# Patient Record
Sex: MALE | Race: OTHER | Employment: FULL TIME | URBAN - METROPOLITAN AREA
[De-identification: names, ages, dates, MRNs, and addresses within clinical notes are randomized per-mention and may not be internally consistent; named-entity substitution may affect disease eponyms.]

---

## 2021-07-08 ENCOUNTER — HOSPITAL ENCOUNTER (OUTPATIENT)
Dept: PHYSICAL THERAPY | Age: 56
Discharge: HOME | End: 2021-07-08
Payer: COMMERCIAL

## 2021-07-08 PROCEDURE — 97140 MANUAL THERAPY 1/> REGIONS: CPT

## 2021-07-08 PROCEDURE — 97161 PT EVAL LOW COMPLEX 20 MIN: CPT

## 2021-07-08 PROCEDURE — 97110 THERAPEUTIC EXERCISES: CPT

## 2021-07-08 NOTE — THERAPY EVALUATION
Adam Velez  : 1965 [address], [address] [place] [address]  [phone]   [phone]        OUTPATIENT PHYSICAL THERAPY:Initial Assessment 2021         ICD-10: Treatment Diagnosis: Pain in left hip (M25.552) and Muscle weakness (generalized) (M62.81) and Other abnormalities of gait and mobility (R26.89)  Precautions/Allergies:   Patient has no allergy information on record. Fall Risk Score:     Ambulatory/Rehab Services H2 Model Falls Risk Assessment    Risk Factors:       (1)  Gender [Male] Ability to Rise from Chair:       (0)  Ability to rise in a single movement    Falls Prevention Plan:       No modifications necessary   Total: (5 or greater = High Risk): 1    © [facility] [employer]. All Rights Reserved. [facility] [employer] Patent #[id number]. Federal Law prohibits the replication, distribution or use without written permission from [employer]     MD Orders: eval and treat MEDICAL/REFERRING DIAGNOSIS:  Unspecified abdominal pain [R10.9]   DATE OF ONSET: several months ago  REFERRING PHYSICIAN: Alina Villareal NP  RETURN PHYSICIAN APPOINTMENT: none     INITIAL ASSESSMENT:  Mr. Earle Nguyen presents with L ant hip pain following roller skating injury. The pain hasn't improved and has actually worsened causing him to have to stop running now. He was screened for a hernia. He will benefit from skilled PT to address the pain, stiffness, weakness he has to return him to premorbid status. PROBLEM LIST (Impacting functional limitations):  1. Decreased Strength  2. Decreased ADL/Functional Activities  3. Decreased Ambulation Ability/Technique  4. Increased Pain  5. Decreased Activity Tolerance  6. Decreased Flexibility/Joint Mobility  7. Decreased [place] with Home Exercise Program INTERVENTIONS PLANNED:  1. Home Exercise Program (HEP)  2. Manual Therapy  3. Therapeutic Activites  4.  Therapeutic Exercise/Strengthening  5. aquatics    TREATMENT PLAN:  Effective Dates: 7/8/2021 TO 9/6/2021 (60 days). Frequency/Duration: 2 times a week for 60 Days  GOALS: (Goals have been discussed and agreed upon with patient.)  Short-Term Functional Goals: Time Frame: 4 weeks  1. Pt to report compliance with HEP  2. Pt to demonstrate full flex L hip without pain  3. Pt to report no pain with normal ADL's  Discharge Goals: Time Frame: 12 weeks  1. Pt to minh therapy inc aquatic running without pain  2. Pt to resume running without pain limiting him  Rehabilitation Potential For Stated Goals: Good  Regarding Sergio Cotto's therapy, I certify that the treatment plan above will be carried out by a therapist or under their direction. Thank you for this referral,  Mary Iniguez, PT       Referring Physician Signature: Susan Welch NP              Date                      HISTORY:   History of Present Injury/Illness (Reason for Referral): A few months ago pt was trying to roller skate. His legs went apart and the L stretched more than it should. He runs everyday. The pain has started to worsen. He thought it was a hernia but the NP said it isn't. He has been trying to stretch and swinging leg in/out, forward/backward hurts. He cannot run now. Normal activities do not bother too much. Past Medical History/Comorbidities:   Mr. Juarez Blake  has no past medical history on file. Mr. Juarez Blake  has no past surgical history on file. Social History/Living Environment:     pt lives alone in single story home  Prior Level of Function/Work/Activity:  FT  Dominant Side:         LEFT  Current Medications:  No current outpatient medications on file.    Date Last Reviewed:  7/8/2021   # of Personal Factors/Comorbidities that affect the Plan of Care: 0: LOW COMPLEXITY   EXAMINATION:   Observation/Orthostatic Postural Assessment:          symm pelvic landmarks   Palpation:          Marked tenderness L ant hip;   ROM:         WNL trunk and B hips; Some pain with end range L hip flex;  Mild tightness L iliopsoas and quad  Strength:         5/5 except L hip flex due to pain;  Pt has difficulty recruiting L glut max  Special Tests:          Pos L FADIR  Neurological Screen:        unremarkable  Functional Mobility:         Gait/Ambulation:  WNL except running unable to minh        Stairs:  Reciprocal pattern  Balance: WNL   Body Structures Involved:  1. Muscles Body Functions Affected:  1. Sensory/Pain  2. Movement Related Activities and Participation Affected:  1. Mobility  2. Domestic Life  3. Community, Social and [place]   # of elements that affect the Plan of Care: 1-2: LOW COMPLEXITY   CLINICAL PRESENTATION:   Presentation: Stable and uncomplicated: LOW COMPLEXITY   CLINICAL DECISION MAKING:   Tool Used: Lower Extremity Functional Scale (LEFS)  Score:  Initial: 53/80 Most Recent: X/80 (Date: -- )   Interpretation of Score: 20 questions each scored on a 5 point scale with 0 representing \"extreme difficulty or unable to perform\" and 4 representing \"no difficulty\". The lower the score, the greater the functional disability. 80/80 represents no disability. Minimal detectable change is 9 points. Medical Necessity:   · Patient demonstrates good rehab potential due to higher previous functional level. Reason for Services/Other Comments:  · Patient continues to require present interventions due to patient's inability to resume full activity inc running without pain.    Use of outcome tool(s) and clinical judgement create a POC that gives a: Clear prediction of patient's progress: LOW COMPLEXITY     Total Treatment Duration:  PT Patient Time In/Time Out  Time In: 0130  Time Out: 0215     Jairon Ends Auxier, PT

## 2021-07-08 NOTE — PROGRESS NOTES
Marchip Steel  : 1965  Primary: [employer]  Secondary:  [address] @ [address], [place] KELI Yang.  [phone]   [phone]      OUTPATIENT PHYSICAL THERAPY: Daily Treatment Note  2021   ICD-10: Treatment Diagnosis: Pain in left hip (M25.552) and Muscle weakness (generalized) (M62.81) and Other abnormalities of gait and mobility (R26.89)     L groin pain    Effective Dates: 2021 TO 2021 (60 days). Frequency/Duration: 2 times a week for 60 Days  GOALS: (Goals have been discussed and agreed upon with patient.)  Short-Term Functional Goals: Time Frame: 4 weeks  1. Pt to report compliance with HEP  2. Pt to demonstrate full flex L hip without pain  3. Pt to report no pain with normal ADL's  Discharge Goals: Time Frame: 12 weeks  1. Pt to minh therapy inc aquatic running without pain  2. Pt to resume running without pain limiting him  _______________________________________________________________________________  Pre-treatment Symptoms/Complaints: Mr. Ashley Albright presents with L ant hip pain following roller skating injury. The pain hasn't improved and has actually worsened causing him to have to stop running now. He was screened for a hernia. Pain: Initial: 3 /10 Post Session:  1/10   Medications Last Reviewed:  2021    Updated Objective Findings:              Palpation:          Marked tenderness L ant hip   ROM:         WNL trunk and B hips; Some pain with end range L hip flex;  Mild tightness L iliopsoas and quad  Strength:         5/5 except L hip flex due to pain; Pt has difficulty recruiting L glut max  Special Tests:          Pos L FADIR  Functional Mobility:         Gait/Ambulation:  WNL except running unable to minh        Stairs:  Reciprocal pattern  Balance:           WNL    Tool Used: Lower Extremity Functional Scale (LEFS)  Score:  Initial: 53/80 Most Recent: X (Date: -- )   Interpretation of Score: 20 questions each scored on a 5 point scale with 0 representing \"extreme difficulty or unable to perform\" and 4 representing \"no difficulty\". The lower the score, the greater the functional disability. 80/80 represents no disability. Minimal detectable change is 9 points. See evaluation note from today     TREATMENT:   THERAPEUTIC ACTIVITY: ( see below for minutes): Therapeutic activities per grid below to improve mobility. Required moderate verbal and manual cues to improve functional mobility . THERAPEUTIC EXERCISE: (see below for minutes):  Exercises per grid below to improve strength. Required moderate verbal and manual cues to promote proper body alignment, promote proper body posture, promote proper body mechanics and promote proper body breathing techniques. Progressed resistance, range, repetitions and complexity of movement as indicated. MANUAL THERAPY: (see below for minutes): Joint mobilization and Soft tissue mobilization was utilized and necessary because of the patient's restricted joint motion, painful spasm, loss of articular motion and restricted motion of soft tissue. MODALITIES: (see below for minutes):      for pain modulation    AQUATIC THERAPY (see below for minutes): Aquatic treatment performed per flow grid for Decreased muscle strength, Decreased endurance, Decreased static/dynamic balance and reactive control, Decreased activity endurance, Decompression, Ease of movement and Low impact and reduced weight bearing activity.     Date: 7/8/21       Modalities:                        Manual Therapy: 10 mins       STM L ant hip supine               Aquatics Activities:        GAIT (F/B/S/M)        SLR gait        Hamstring Curl gait         Rockettes        Squats        Calf raises        Hamstring stretch B        Piriformis stretch B        Step ups ant B        Deep well                                Therapeutic Exercises: 20 mins       Pt education, postural education, HEP, functional breathing 10 mins       Prone quad stretch L 2 x 20 sec       Orion stretch L 3 x 30 sec       S/L hip abd L X 15       Clams L X 15               HEP: see hand out    [employer] Portal  Treatment/Session Summary:    · Response to Treatment:  Pt has good understanding and minh to information presented. · Communication/Consultation:  None today  · Equipment provided today:  None today  · Recommendations/Intent for next treatment session: Next visit will focus on stretching, strengthening, manual techniques as indicated.     Total Treatment Billable Duration:  45 mins  PT Patient Time In/Time Out  Time In: 0130  Time Out: 0215    Chase Sparks PT    Future Appointments   Date Time Provider Paul Peace   7/12/2021  8:45 AM Corinna Everett, PT [facility]   7/16/2021  8:00 AM Corinna Everett, PT [facility]   7/19/2021  8:00 AM Corinna Everett, PT [facility]   7/21/2021  4:00 PM Corinna Everett, PT [facility]   7/28/2021 10:15 AM Coirnna Everett, PT [facility]   7/30/2021  8:00 AM Corinna Everett, PT [facility]

## 2021-07-13 ENCOUNTER — HOSPITAL ENCOUNTER (OUTPATIENT)
Dept: PHYSICAL THERAPY | Age: 56
Discharge: HOME | End: 2021-07-13
Payer: COMMERCIAL

## 2021-07-13 PROCEDURE — 97140 MANUAL THERAPY 1/> REGIONS: CPT

## 2021-07-13 PROCEDURE — 97110 THERAPEUTIC EXERCISES: CPT

## 2021-07-13 NOTE — PROGRESS NOTES
Graciela White  : 1965  Primary: [employer]  Secondary:  [address] @ [address], [facility] [place].  [phone]   [phone]      OUTPATIENT PHYSICAL THERAPY: Daily Treatment Note  2021   ICD-10: Treatment Diagnosis: Pain in left hip (M25.552) and Muscle weakness (generalized) (M62.81) and Other abnormalities of gait and mobility (R26.89)     L groin pain    Effective Dates: 2021 TO 2021 (60 days). Frequency/Duration: 2 times a week for 60 Days  GOALS: (Goals have been discussed and agreed upon with patient.)  Short-Term Functional Goals: Time Frame: 4 weeks  1. Pt to report compliance with HEP  2. Pt to demonstrate full flex L hip without pain  3. Pt to report no pain with normal ADL's  Discharge Goals: Time Frame: 12 weeks  1. Pt to minh therapy inc aquatic running without pain  2. Pt to resume running without pain limiting him  _______________________________________________________________________________  Pre-treatment Symptoms/Complaints: I am feeling better. I can jog without pain. I still can't really run but jogging no longer hurts. Pain: Initial: 1 /10 Post Session:  No pain with ex's   Medications Last Reviewed:  2021    Updated Objective Findings:              Palpation:          Marked tenderness L ant hip   ROM:         WNL trunk and B hips;   Some pain with end range L hip flex;  Mild tightness L iliopsoas and quad  Strength:         5/5 except L hip flex due to pain; Pt has difficulty recruiting L glut max  Special Tests:          Pos L FADIR  Functional Mobility:         Gait/Ambulation:  WNL except running unable to minh        Stairs:  Reciprocal pattern      Tool Used: Lower Extremity Functional Scale (LEFS)  Score:  Initial:  Most Recent:  (Date: -- )   Interpretation of Score: 20 questions each scored on a 5 point scale with 0 representing \"extreme difficulty or unable to perform\" and 4 representing \"no difficulty\". The lower the score, the greater the functional disability. 80/80 represents no disability. Minimal detectable change is 9 points. TREATMENT:   THERAPEUTIC ACTIVITY: ( see below for minutes): Therapeutic activities per grid below to improve mobility. Required moderate verbal and manual cues to improve functional mobility . THERAPEUTIC EXERCISE: (see below for minutes):  Exercises per grid below to improve strength. Required moderate verbal and manual cues to promote proper body alignment, promote proper body posture, promote proper body mechanics and promote proper body breathing techniques. Progressed resistance, range, repetitions and complexity of movement as indicated. MANUAL THERAPY: (see below for minutes): Joint mobilization and Soft tissue mobilization was utilized and necessary because of the patient's restricted joint motion, painful spasm, loss of articular motion and restricted motion of soft tissue. MODALITIES: (see below for minutes):      for pain modulation    AQUATIC THERAPY (see below for minutes): Aquatic treatment performed per flow grid for Decreased muscle strength, Decreased endurance, Decreased static/dynamic balance and reactive control, Decreased activity endurance, Decompression, Ease of movement and Low impact and reduced weight bearing activity.     Date: 7/8/21 7/13/21  Visit 2         Modalities:                                 Manual Therapy: 10 mins 10 mins         STM L ant hip supine supine                    Aquatics Activities:           GAIT (F/B/S/M)           SLR gait           Hamstring Curl gait            Rockettes           Squats           Calf raises           Hamstring stretch B           Piriformis stretch B           Step ups ant B           Deep well                                            Therapeutic Exercises: 20 mins 35 mins         Pt education, postural education, HEP, functional breathing 10 mins          Prone quad stretch L 2 x 20 sec          Orion stretch L 3 x 30 sec 3 mins         S/L hip abd L X 15 X 30         Clams L X 15 X 30         Bridging   Double   Single B         Sit to stand   X 30         Step ups ant B  6 in x 30         Step ups lat B  6 in x 20         HEP: see hand out    [employer] Portal  Treatment/Session Summary:    · Response to Treatment:  No pain with ex's. Good ex minh. Will utilize aquatics next visit to work on jogging/running in addition to stretching, strengthening. · Communication/Consultation:  None today  · Equipment provided today:  None today  · Recommendations/Intent for next treatment session: Next visit will focus on stretching, strengthening, manual techniques as indicated.     Total Treatment Billable Duration:  45 mins  PT Patient Time In/Time Out  Time In: 1100  Time Out: Andres [id number], PT    Future Appointments   Date Time Provider Paul Peace   7/16/2021  8:00 AM Donald Everett, PT [facility]   7/20/2021  9:30 AM Donald Everett, PT [facility]   7/23/2021  3:00 PM Donald Everett, PT [facility]   7/28/2021 10:15 AM Donald Everett, PT [facility]   7/30/2021  8:00 AM Donald Everett, PT [facility]

## 2021-07-16 ENCOUNTER — HOSPITAL ENCOUNTER (OUTPATIENT)
Dept: PHYSICAL THERAPY | Age: 56
Discharge: HOME | End: 2021-07-16
Payer: COMMERCIAL

## 2021-07-16 PROCEDURE — 97113 AQUATIC THERAPY/EXERCISES: CPT

## 2021-07-16 NOTE — PROGRESS NOTES
Floresita López  : 1965  Primary: [employer]  Secondary:  [address] @ [address], [facility] [place].  [phone]   [phone]      OUTPATIENT PHYSICAL THERAPY: Daily Treatment Note  2021   ICD-10: Treatment Diagnosis: Pain in left hip (M25.552) and Muscle weakness (generalized) (M62.81) and Other abnormalities of gait and mobility (R26.89)     L groin pain    Effective Dates: 2021 TO 2021 (60 days). Frequency/Duration: 2 times a week for 60 Days  GOALS: (Goals have been discussed and agreed upon with patient.)  Short-Term Functional Goals: Time Frame: 4 weeks  1. Pt to report compliance with HEP  2. Pt to demonstrate full flex L hip without pain  3. Pt to report no pain with normal ADL's  Discharge Goals: Time Frame: 12 weeks  1. Pt to minh therapy inc aquatic running without pain  2. Pt to resume running without pain limiting him  _______________________________________________________________________________  Pre-treatment Symptoms/Complaints:  I ran 3 miles this morning with almost no pain. Pain: Initial: 1 /10 Post Session:  Running in the pool feels so good because I am not worried about pain so I let the hip move freely. Medications Last Reviewed:  2021    Updated Objective Findings:            Palpation:          Marked tenderness L ant hip   ROM:         WNL trunk and B hips;   Some pain with end range L hip flex;  Mild tightness L iliopsoas and quad  Strength:         5/5 except L hip flex due to pain; Pt has difficulty recruiting L glut max  Special Tests:          Pos L FADIR  Functional Mobility:         Gait/Ambulation:  WNL except running unable to minh        Stairs:  Reciprocal pattern      Tool Used: Lower Extremity Functional Scale (LEFS)  Score:  Initial:  Most Recent:  (Date: -- )   Interpretation of Score: 20 questions each scored on a 5 point scale with 0 representing \"extreme difficulty or unable to perform\" and 4 representing \"no difficulty\". The lower the score, the greater the functional disability. 80/80 represents no disability. Minimal detectable change is 9 points. TREATMENT:   THERAPEUTIC ACTIVITY: ( see below for minutes): Therapeutic activities per grid below to improve mobility. Required moderate verbal and manual cues to improve functional mobility . THERAPEUTIC EXERCISE: (see below for minutes):  Exercises per grid below to improve strength. Required moderate verbal and manual cues to promote proper body alignment, promote proper body posture, promote proper body mechanics and promote proper body breathing techniques. Progressed resistance, range, repetitions and complexity of movement as indicated. MANUAL THERAPY: (see below for minutes): Joint mobilization and Soft tissue mobilization was utilized and necessary because of the patient's restricted joint motion, painful spasm, loss of articular motion and restricted motion of soft tissue. MODALITIES: (see below for minutes):      for pain modulation    AQUATIC THERAPY (see below for minutes): Aquatic treatment performed per flow grid for Decreased muscle strength, Decreased endurance, Decreased static/dynamic balance and reactive control, Decreased activity endurance, Decompression, Ease of movement and Low impact and reduced weight bearing activity.     Date: 7/8/21 7/13/21  Visit 2 7/16/21  Visit 3        Modalities:                                 Manual Therapy: 10 mins 10 mins         STM L ant hip supine supine                    Aquatics Activities:   55 mins        GAIT (F/B/S/M)   3 laps        SLR gait   3 laps        Rockettes   3 laps        Step ups ant B           Deep well jog with noodle   5 mins        Pool jog   25 mins                              Therapeutic Exercises: 20 mins 35 mins         Pt education, postural education, HEP, functional breathing 10 mins          Prone quad stretch L 2 x 20 sec Orion stretch L 3 x 30 sec 3 mins         S/L hip abd L X 15 X 30         Clams L X 15 X 30         Bridging   Double   Single B         Sit to stand   X 30         Step ups ant B  6 in x 30         Step ups lat B  6 in x 20         HEP: see hand out    [employer] Portal  Treatment/Session Summary:    · Response to Treatment:  Good response to pool jogging. No pain with ex's. Continue as indicated. · Communication/Consultation:  None today  · Equipment provided today:  None today  · Recommendations/Intent for next treatment session: Next visit will focus on stretching, strengthening, manual techniques as indicated.     Total Treatment Billable Duration:  55 mins  PT Patient Time In/Time Out  Time In: 2453  Time Out: [address] ([place] [address]), PT    Future Appointments   Date Time Provider Paul Peace   7/20/2021  9:30 AM Marguerite, Donald , PT [facility]   7/23/2021  3:00 PM StocktonAnaDonald , PT [facility]   7/28/2021 10:15 AM Amrguerite Donald , PT [facility]   7/30/2021  8:00 AM [place] Donald , PT [facility]   8/2/2021  4:00 PM [place] Donald , PT [facility]   8/4/2021  4:00 PM Marguerite Donald , PT [facility]   8/9/2021  4:00 PM Marguerite Donald , PT [facility]   8/12/2021  8:00 AM Marguerite Donald , PT [facility]   8/17/2021  8:00 AM Marguerite, Donald , PT [facility]   8/19/2021  8:00 AM Marguerite Donald , PT [facility]   8/23/2021  4:00 PM [place] Donald , PT [facility]   8/25/2021  8:00 AM [place] Donald , PT [facility]   8/30/2021  4:00 PM Donald Everett, PT [facility]

## 2021-07-20 ENCOUNTER — HOSPITAL ENCOUNTER (OUTPATIENT)
Dept: PHYSICAL THERAPY | Age: 56
Discharge: HOME | End: 2021-07-20
Payer: COMMERCIAL

## 2021-07-20 PROCEDURE — 97110 THERAPEUTIC EXERCISES: CPT

## 2021-07-20 NOTE — PROGRESS NOTES
Alexandru Mayra  : 1965  Primary: [employer]  Secondary:  [address]  [phone]   [phone]      OUTPATIENT PHYSICAL THERAPY: Daily Treatment Note  2021   ICD-10: Treatment Diagnosis: Pain in left hip (M25.552) and Muscle weakness (generalized) (M62.81) and Other abnormalities of gait and mobility (R26.89)     L groin pain    Effective Dates: 2021 TO 2021 (60 days). Frequency/Duration: 2 times a week for 60 Days  GOALS: (Goals have been discussed and agreed upon with patient.)  Short-Term Functional Goals: Time Frame: 4 weeks  1. Pt to report compliance with HEP  2. Pt to demonstrate full flex L hip without pain  3. Pt to report no pain with normal ADL's  Discharge Goals: Time Frame: 12 weeks  1. Pt to [facility] aquatic running without pain  2. Pt to resume running without pain limiting him  _______________________________________________________________________________  Pre-treatment Symptoms/Complaints:  I felt great when I left but then I woke up with a sharp pain in my groin again. I guess it takes a long time to get well. I know I am late but I can only stay a half hour today. I don't want to do the pool today. Pain: Initial: 1 /10 Post Session: no pain with ex's   Medications Last Reviewed:  2021    Updated Objective Findings:            Palpation:          Marked tenderness L ant hip   ROM:         WNL trunk and B hips;   Some pain with end range L hip flex;  Mild tightness L iliopsoas and quad  Strength:         5/5 except L hip flex due to pain; Pt has difficulty recruiting L glut max  Special Tests:          Pos L FADIR  Functional Mobility:         Gait/Ambulation:  WNL except running unable to minh        Stairs:  Reciprocal pattern      Tool Used: Lower Extremity Functional Scale (LEFS)  Score:  Initial:  Most Recent:  (Date: -- )   Interpretation of Score: 20 questions each scored on a 5 point scale with 0 representing \"extreme difficulty or unable to perform\" and 4 representing \"no difficulty\". The lower the score, the greater the functional disability. 80/80 represents no disability. Minimal detectable change is 9 points. TREATMENT:   THERAPEUTIC ACTIVITY: ( see below for minutes): Therapeutic activities per grid below to improve mobility. Required moderate verbal and manual cues to improve functional mobility . THERAPEUTIC EXERCISE: (see below for minutes):  Exercises per grid below to improve strength. Required moderate verbal and manual cues to promote proper body alignment, promote proper body posture, promote proper body mechanics and promote proper body breathing techniques. Progressed resistance, range, repetitions and complexity of movement as indicated. MANUAL THERAPY: (see below for minutes): Joint mobilization and Soft tissue mobilization was utilized and necessary because of the patient's restricted joint motion, painful spasm, loss of articular motion and restricted motion of soft tissue. MODALITIES: (see below for minutes):      for pain modulation    AQUATIC THERAPY (see below for minutes): Aquatic treatment performed per flow grid for Decreased muscle strength, Decreased endurance, Decreased static/dynamic balance and reactive control, Decreased activity endurance, Decompression, Ease of movement and Low impact and reduced weight bearing activity.     Date: 7/8/21 7/13/21  Visit 2 7/16/21  Visit 3 7/20/21  Visit 4       Modalities:                                 Manual Therapy: 10 mins 10 mins         STM L ant hip supine supine                    Aquatics Activities:   55 mins        GAIT (F/B/S/M)   3 laps        SLR gait   3 laps        Rockettes   3 laps        Step ups ant B           Deep well jog with noodle   5 mins        Pool jog   25 mins                              Therapeutic Exercises: 20 mins 35 mins  30 mins Pt education, postural education, HEP, functional breathing 10 mins          Prone quad stretch L 2 x 20 sec          Orion stretch L 3 x 30 sec 3 mins  3 mins       Prone hip ext B    X 30       S/L hip abd L X 15 X 30  X 30       Clams L X 15 X 30  X 30       Bridging   Double   Single B  Double x 10  Single x 20       Sit to stand   X 30 X 30 X 30       Step ups ant B  6 in x 30 6 in x 30 6 in x 30       Step ups lat B  6 in x 20  6 in x 30       Lat stepping     Green   3 laps       HEP: see hand out    [facility] Portal  Treatment/Session Summary:    · Response to Treatment: Pt struggles with S/L hip abd due to marked weakness. Cues needed for correct ex performance. Pt encouraged to perform HEP to maximize results. Continue as indicated. · Communication/Consultation:  None today  · Equipment provided today:  None today  · Recommendations/Intent for next treatment session: Next visit will focus on stretching, strengthening, manual techniques as indicated.     Total Treatment Billable Duration:  30 mins  PT Patient Time In/Time Out  Time In: 0938  Time Out: Rowan 30, PT    Future Appointments   Date Time Provider Paul Peace   7/23/2021  3:00 PM [place], Tauna Perches, PT [facility]   7/28/2021 10:15 AM [place], Tauna Perches, PT [facility]   7/30/2021  8:00 AM [place], Tauna Perches, PT [facility]   8/2/2021  4:00 PM [place], Tauna Perches, PT [facility]   8/4/2021  4:00 PM Marg[place], Tauna Perches, PT [facility]   8/9/2021  4:00 PM [place], Tauna Perches, PT [facility]   8/12/2021  8:00 AM [place], Tauna Perches, PT [facility]   8/17/2021  8:00 AM [place], Tauna Perches, PT [facility]   8/19/2021  8:00 AM [place], Tauna Perches, PT [facility]   8/23/2021  4:00 PM Marg[place], Tauna Perches, PT [facility]   8/25/2021  8:00 AM Marg[place], Tauna Perches, [facility]   8/30/2021  4:00 PM Kayla Everett PT [facility]

## 2021-07-23 ENCOUNTER — HOSPITAL ENCOUNTER (OUTPATIENT)
Dept: PHYSICAL THERAPY | Age: 56
Discharge: HOME | End: 2021-07-23
Payer: COMMERCIAL

## 2021-07-23 PROCEDURE — 97113 AQUATIC THERAPY/EXERCISES: CPT

## 2021-07-23 NOTE — PROGRESS NOTES
Graciela White  : 1965  Primary: [employer]  Secondary:  [address] @ [place] [address].  [phone]   [phone]      OUTPATIENT PHYSICAL THERAPY: Daily Treatment Note  2021   ICD-10: Treatment Diagnosis: Pain in left hip (M25.552) and Muscle weakness (generalized) (M62.81) and Other abnormalities of gait and mobility (R26.89)     L groin pain    Effective Dates: 2021 TO 2021 (60 days). Frequency/Duration: 2 times a week for 60 Days  GOALS: (Goals have been discussed and agreed upon with patient.)  Short-Term Functional Goals: Time Frame: 4 weeks  1. Pt to report compliance with HEP  2. Pt to demonstrate full flex L hip without pain  3. Pt to report no pain with normal ADL's  Discharge Goals: Time Frame: 12 weeks  1. Pt to [facility] aquatic running without pain  2. Pt to resume running without pain limiting him  _______________________________________________________________________________  Pre-treatment Symptoms/Complaints: I am doing okay. I ran a couple of times and just a little soreness. No pain. Pain: Initial: 1 /10 Post Session: no pain with ex's. This feels even better than last time. Medications Last Reviewed:  2021    Updated Objective Findings:            Palpation:          Marked tenderness L ant hip   ROM:         WNL trunk and B hips;   Some pain with end range L hip flex;  Mild tightness L iliopsoas and quad  Strength:         5/5 except L hip flex due to pain; Pt has difficulty recruiting L glut max  Special Tests:          Pos L FADIR  Functional Mobility:         Gait/Ambulation:  WNL except running unable to minh        Stairs:  Reciprocal pattern      Tool Used: Lower Extremity Functional Scale (LEFS)  Score:  Initial:  Most Recent:  (Date: -- )   Interpretation of Score: 20 questions each scored on a 5 point scale with 0 representing \"extreme difficulty or unable to perform\" and 4 representing \"no difficulty\". The lower the score, the greater the functional disability. 80/80 represents no disability. Minimal detectable change is 9 points. TREATMENT:   THERAPEUTIC ACTIVITY: ( see below for minutes): Therapeutic activities per grid below to improve mobility. Required moderate verbal and manual cues to improve functional mobility . THERAPEUTIC EXERCISE: (see below for minutes):  Exercises per grid below to improve strength. Required moderate verbal and manual cues to promote proper body alignment, promote proper body posture, promote proper body mechanics and promote proper body breathing techniques. Progressed resistance, range, repetitions and complexity of movement as indicated. MANUAL THERAPY: (see below for minutes): Joint mobilization and Soft tissue mobilization was utilized and necessary because of the patient's restricted joint motion, painful spasm, loss of articular motion and restricted motion of soft tissue. MODALITIES: (see below for minutes):      for pain modulation    AQUATIC THERAPY (see below for minutes): Aquatic treatment performed per flow grid for Decreased muscle strength, Decreased endurance, Decreased static/dynamic balance and reactive control, Decreased activity endurance, Decompression, Ease of movement and Low impact and reduced weight bearing activity.     Date: 7/8/21 7/13/21  Visit 2 7/16/21  Visit 3 7/20/21  Visit 4 7/23/21  Visit 5      Modalities:                                 Manual Therapy: 10 mins 10 mins         STM L ant hip supine supine                    Aquatics Activities:   55 mins  45 mins      GAIT (F/B/S/M)   3 laps  4 laps      SLR gait   3 laps        Rockettes   3 laps        Step ups ant B           Deep well jog with noodle   5 mins        Pool jog   25 mins  25 mins                            Therapeutic Exercises: 20 mins 35 mins  30 mins       Pt education, postural education, HEP, functional breathing 10 mins          Prone quad stretch L 2 x 20 sec          Orion stretch L 3 x 30 sec 3 mins  3 mins       Prone hip ext B    X 30       S/L hip abd L X 15 X 30  X 30       Clams L X 15 X 30  X 30       Bridging   Double   Single B  Double x 10  Single x 20       Sit to stand   X 30 X 30 X 30       Step ups ant B  6 in x 30 6 in x 30 6 in x 30       Step ups lat B  6 in x 20  6 in x 30       Lat stepping     Green   3 laps       HEP: see hand out    [facility] Portal  Treatment/Session Summary:    · Response to Treatment: Good response and progress. Continue as indicated. · Communication/Consultation:  None today  · Equipment provided today:  None today  · Recommendations/Intent for next treatment session: Next visit will focus on stretching, strengthening, manual techniques as indicated.     Total Treatment Billable Duration:  45 mins  PT Patient Time In/Time Out  Time In: 0315  Time Out: 0400    Gilberto Aguirre, PT    Future Appointments   Date Time Provider Paul Peace   7/28/2021 10:15 AM Angela Everett, PT [facility]   7/30/2021  8:00 AM Angela Everett, PT [facility]   8/2/2021  4:00 PM Angela Everett, PT [facility]   8/4/2021  4:00 PM Angela Everett, PT [facility]   8/9/2021  4:00 PM Angela Everett, PT [facility]   8/12/2021  8:00 AM Angela Everett, PT [facility]   8/17/2021  8:00 AM Angela Everett, PT [facility]   8/19/2021  8:00 AM Angela Everett, PT [facility]   8/23/2021  4:00 PM Angela Everett, PT [facility]   8/25/2021  8:00 AM Angela Everett, PT [facility]   8/30/2021  4:00 PM Angela Everett, PT [facility]

## 2021-07-28 ENCOUNTER — HOSPITAL ENCOUNTER (OUTPATIENT)
Dept: PHYSICAL THERAPY | Age: 56
Discharge: HOME | End: 2021-07-28
Payer: COMMERCIAL

## 2021-07-28 PROCEDURE — 97110 THERAPEUTIC EXERCISES: CPT

## 2021-07-28 NOTE — PROGRESS NOTES
Svetlana Ahuja  : 1965  Primary: [employer] [place] [employer]  Secondary:  [facility] @ [address], [address] [place] [address]  [phone]   [phone]      OUTPATIENT PHYSICAL THERAPY: Daily Treatment Note  2021   ICD-10: Treatment Diagnosis: Pain in left hip (M25.552) and Muscle weakness (generalized) (M62.81) and Other abnormalities of gait and mobility (R26.89)     L groin pain    Effective Dates: 2021 TO 2021 (60 days). Frequency/Duration: 2 times a week for 60 Days  GOALS: (Goals have been discussed and agreed upon with patient.)  Short-Term Functional Goals: Time Frame: 4 weeks  1. Pt to report compliance with HEP  2. Pt to demonstrate full flex L hip without pain  3. Pt to report no pain with normal ADL's  Discharge Goals: Time Frame: 12 weeks  1. Pt to [facility] aquatic running without pain  2. Pt to resume running without pain limiting him  _______________________________________________________________________________  Pre-treatment Symptoms/Complaints: I did not bring my swim clothes and I can only stay about a half hour again. I have not had any pain since before I came last time. I ran this morning and it felt good. Pain: Initial: 0/10 Post Session: no pain with ex's. Medications Last Reviewed:  2021    Updated Objective Findings:            Palpation:          Marked tenderness L ant hip   ROM:         WNL trunk and B hips;   Some pain with end range L hip flex;  Mild tightness L iliopsoas and quad  Strength:         5/5 except L hip flex due to pain; Pt has difficulty recruiting L glut max  Special Tests:          Pos L FADIR  Functional Mobility:         Gait/Ambulation:  WNL except running unable to minh        Stairs:  Reciprocal pattern      Tool Used: Lower Extremity Functional Scale (LEFS)  Score:  Initial:  Most Recent:  (Date: -- )   Interpretation of Score: 20 questions each scored on a 5 point scale with 0 representing \"extreme difficulty or unable to perform\" and 4 representing \"no difficulty\". The lower the score, the greater the functional disability. 80/80 represents no disability. Minimal detectable change is 9 points. TREATMENT:   THERAPEUTIC ACTIVITY: ( see below for minutes): Therapeutic activities per grid below to improve mobility. Required moderate verbal and manual cues to improve functional mobility . THERAPEUTIC EXERCISE: (see below for minutes):  Exercises per grid below to improve strength. Required moderate verbal and manual cues to promote proper body alignment, promote proper body posture, promote proper body mechanics and promote proper body breathing techniques. Progressed resistance, range, repetitions and complexity of movement as indicated. MANUAL THERAPY: (see below for minutes): Joint mobilization and Soft tissue mobilization was utilized and necessary because of the patient's restricted joint motion, painful spasm, loss of articular motion and restricted motion of soft tissue. MODALITIES: (see below for minutes):      for pain modulation    AQUATIC THERAPY (see below for minutes): Aquatic treatment performed per flow grid for Decreased muscle strength, Decreased endurance, Decreased static/dynamic balance and reactive control, Decreased activity endurance, Decompression, Ease of movement and Low impact and reduced weight bearing activity.     Date: 7/8/21 7/13/21  Visit 2 7/16/21  Visit 3 7/20/21  Visit 4 7/23/21  Visit 5 7/28/21  Visit 6     Modalities:                                 Manual Therapy: 10 mins 10 mins         STM L ant hip supine supine                    Aquatics Activities:   55 mins  45 mins      GAIT (F/B/S/M)   3 laps  4 laps      SLR gait   3 laps        Rockettes   3 laps        Step ups ant B           Deep well jog with noodle   5 mins        Pool jog   25 mins  25 mins                            Therapeutic Exercises: 20 mins 35 mins  30 mins 30 mins     Pt education, postural education, HEP, functional breathing 10 mins          Prone quad stretch L 2 x 20 sec          Orion stretch L 3 x 30 sec 3 mins  3 mins  3 mins     Prone hip ext B    X 30  X 30     S/L hip abd L X 15 X 30  X 30  X 30     Clams L X 15 X 30  X 30  X 30     Bridging   Double   Single B  Double x 10  Single x 20       Sit to stand   X 30 X 30 X 30  X 30 R foot forward     Step ups ant B  6 in x 30 6 in x 30 6 in x 30  6 in x 30     Step ups lat B  6 in x 20  6 in x 30  6 in x 30     Lat stepping     Green   3 laps  Blue 4 laps     HEP: see hand out    [facility] Portal  Treatment/Session Summary:    · Response to Treatment: No pain with ex's. Will consider D/C at next visit if pt is still pain free. · Communication/Consultation:  None today  · Equipment provided today:  None today  · Recommendations/Intent for next treatment session: Next visit will focus on stretching, strengthening, manual techniques as indicated.     Total Treatment Billable Duration:  30 mins  PT Patient Time In/Time Out  Time In: 1015  Time Out: BRUCE Mccabe    Future Appointments   Date Time Provider Paul Peace   7/30/2021  8:00 AM Charly Everett, PT [facility]   8/2/2021  4:00 PM Charly Everett, PT [facility]   8/4/2021  4:00 PM Charly Everett, PT [facility]   8/9/2021  4:00 PM Charly Everett, PT [facility]   8/12/2021  8:00 AM Charly Everett, PT [facility]   8/17/2021  8:00 AM Charly Everett, PT [facility]   8/19/2021  8:00 AM Charly Everett, PT [facility]   8/23/2021  4:00 PM Charly Everett, PT [facility]   8/25/2021  8:00 AM Charly Everett, PT [facility]   8/30/2021  4:00 PM [facility]

## 2021-07-30 ENCOUNTER — HOSPITAL ENCOUNTER (OUTPATIENT)
Dept: PHYSICAL THERAPY | Age: 56
Discharge: HOME | End: 2021-07-30
Payer: COMMERCIAL

## 2021-07-30 NOTE — THERAPY DISCHARGE
Eliz Larson   [phone] [place] [address].  [phone]   [phone]           Discontinuation summary    REFERRING PHYSICIAN: Daya Villareal NP  MEDICAL/REFERRING DIAGNOSIS:  · Unspecified abdominal pain [R10.9]  ATTENDANCE: Eliz Larson has attended 6 out of 7 visits, with 1 cancellation(s) and 0 no shows. ASSESSMENT:DATE: 2021    PROGRESS: Eliz Larson made good progress resuming running without pain. His girlfriend was instructed in how to help stretch his hip flexors for home maintenance. RECOMMENDATIONS: Discharge patient due to pt request due to pain being eliminated.     Thank you for this referral,    Juliet Everett, PT

## 2021-08-02 ENCOUNTER — APPOINTMENT (OUTPATIENT)
Dept: PHYSICAL THERAPY | Age: 56
End: 2021-08-02

## 2021-08-03 ENCOUNTER — APPOINTMENT (OUTPATIENT)
Dept: PHYSICAL THERAPY | Age: 56
End: 2021-08-03

## 2021-08-04 ENCOUNTER — APPOINTMENT (OUTPATIENT)
Dept: PHYSICAL THERAPY | Age: 56
End: 2021-08-04

## 2021-08-09 ENCOUNTER — APPOINTMENT (OUTPATIENT)
Dept: PHYSICAL THERAPY | Age: 56
End: 2021-08-09

## 2021-08-12 ENCOUNTER — APPOINTMENT (OUTPATIENT)
Dept: PHYSICAL THERAPY | Age: 56
End: 2021-08-12

## 2021-08-17 ENCOUNTER — APPOINTMENT (OUTPATIENT)
Dept: PHYSICAL THERAPY | Age: 56
End: 2021-08-17

## 2021-08-19 ENCOUNTER — APPOINTMENT (OUTPATIENT)
Dept: PHYSICAL THERAPY | Age: 56
End: 2021-08-19

## 2021-08-23 ENCOUNTER — APPOINTMENT (OUTPATIENT)
Dept: PHYSICAL THERAPY | Age: 56
End: 2021-08-23

## 2021-08-25 ENCOUNTER — APPOINTMENT (OUTPATIENT)
Dept: PHYSICAL THERAPY | Age: 56
End: 2021-08-25

## 2021-08-30 ENCOUNTER — APPOINTMENT (OUTPATIENT)
Dept: PHYSICAL THERAPY | Age: 56
End: 2021-08-30

## 2021-10-01 NOTE — PROGRESS NOTES
A (Gdwr Fixed Jtip Ptfe .035 150) guidewire was introduced.  Pt came 12 mins late for his appt dressed in work clothes stating he has a meeting and is still doing well so we will discharge. He will call with any questions.